# Patient Record
Sex: FEMALE | URBAN - METROPOLITAN AREA
[De-identification: names, ages, dates, MRNs, and addresses within clinical notes are randomized per-mention and may not be internally consistent; named-entity substitution may affect disease eponyms.]

---

## 2021-11-03 ENCOUNTER — OFFICE VISIT (OUTPATIENT)
Dept: URBAN - METROPOLITAN AREA CLINIC 48 | Facility: CLINIC | Age: 31
End: 2021-11-03
Payer: COMMERCIAL

## 2021-11-03 PROCEDURE — 92004 COMPRE OPH EXAM NEW PT 1/>: CPT | Performed by: OPHTHALMOLOGY

## 2021-11-03 ASSESSMENT — INTRAOCULAR PRESSURE
OS: 15
OD: 13

## 2021-11-03 NOTE — IMPRESSION/PLAN
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 Plan: 6th nerve palsy worse than last seen at [facility] 

bilateral 6th nerve palsy unclear if related to high ICP Had been off Diamox 24 hrs prior to LP opening pressure 22 cm H20 Patient to be referred to Dr. Rogerio Rain in [facility] at [facility] If Dr. Lawrence Stinson has any questions please call Dr. Viet Latif at [phone] RTC  Next week followup  with

## 2021-11-10 ENCOUNTER — OFFICE VISIT (OUTPATIENT)
Dept: URBAN - METROPOLITAN AREA CLINIC 48 | Facility: CLINIC | Age: 31
End: 2021-11-10
Payer: COMMERCIAL

## 2021-11-10 PROCEDURE — 92012 INTRM OPH EXAM EST PATIENT: CPT | Performed by: OPHTHALMOLOGY

## 2021-11-10 PROCEDURE — 92083 EXTENDED VISUAL FIELD XM: CPT | Performed by: OPHTHALMOLOGY

## 2021-11-10 ASSESSMENT — INTRAOCULAR PRESSURE
OD: 17
OS: 14

## 2021-11-10 NOTE — IMPRESSION/PLAN
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 Referred to Neuro Ophthalmologist Dr. Alexandra Rojas Friday November 12 4 pm

Patient needs VA check, OCT nerve and VF 24-2 prior to consult which has been done in this office Plan: 90% palsy and worsening. Discussed with patient will refer to Neuroophthalmologist.  Will send patient with notes by hand.   



RTC 2 week follow up with OCT nerve

## 2021-11-24 ENCOUNTER — OFFICE VISIT (OUTPATIENT)
Dept: URBAN - METROPOLITAN AREA CLINIC 48 | Facility: CLINIC | Age: 31
End: 2021-11-24
Payer: COMMERCIAL

## 2021-11-24 DIAGNOSIS — H49.23 BILATERAL 6TH NERVE PALSY: Primary | ICD-10-CM

## 2021-11-24 PROCEDURE — 92014 COMPRE OPH EXAM EST PT 1/>: CPT | Performed by: OPHTHALMOLOGY

## 2021-11-24 ASSESSMENT — INTRAOCULAR PRESSURE
OS: 17
OD: 14

## 2021-11-24 NOTE — IMPRESSION/PLAN
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 Saw Neuro Ophthalmologist Dr. Landon Newberry who told patient he feels this will get better with time. Today's exam coincides with this. She has a 60% ABduction deficit on the right and 50% on the left. Although this is significant is improved from my last 2 exams. Plan: Diamox 500 twice a day to be continued I will check to confirm improvement in 2-3 weeks OCT Nerve at that time She is to see Dr. Landon Newberry again in January - today's note faxed to Dr. Landon Newberry RTC 2 wks.  with RNFL

## 2021-12-10 ENCOUNTER — OFFICE VISIT (OUTPATIENT)
Dept: URBAN - METROPOLITAN AREA CLINIC 48 | Facility: CLINIC | Age: 31
End: 2021-12-10
Payer: COMMERCIAL

## 2021-12-10 PROCEDURE — 92012 INTRM OPH EXAM EST PATIENT: CPT | Performed by: OPHTHALMOLOGY

## 2021-12-10 ASSESSMENT — INTRAOCULAR PRESSURE
OD: 14
OS: 16

## 2021-12-10 NOTE — IMPRESSION/PLAN
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 Saw Neuro Ophthalmologist Dr. Damien Briseno who told patient he feels this will get better with time. Today's exam coincides with this. She has a 10-15 % ABduction deficit on the right and 10-15 % on the left. Although this is significant is improved from my last 2 exams. Plan: PO 
Continue Diamox 500 mg. 1 tab. BID 
(consider tapering) keep appt. as scheduled w/Dr. Damien Briseno again in January RTC 2 months with VF 24-2,  RNFL, Ishahara test,  tech to do pupil check then dilate.

## 2022-02-15 ENCOUNTER — OFFICE VISIT (OUTPATIENT)
Dept: URBAN - METROPOLITAN AREA CLINIC 48 | Facility: CLINIC | Age: 32
End: 2022-02-15
Payer: COMMERCIAL

## 2022-02-15 PROCEDURE — 92014 COMPRE OPH EXAM EST PT 1/>: CPT | Performed by: OPHTHALMOLOGY

## 2022-02-15 PROCEDURE — 92083 EXTENDED VISUAL FIELD XM: CPT | Performed by: OPHTHALMOLOGY

## 2022-02-15 PROCEDURE — 92133 CPTRZD OPH DX IMG PST SGM ON: CPT | Performed by: OPHTHALMOLOGY

## 2022-02-15 ASSESSMENT — INTRAOCULAR PRESSURE
OD: 13
OS: 15

## 2022-02-15 NOTE — IMPRESSION/PLAN
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 Plan: suspect 6th nerve from increased ICP from IUD. Full EOM today. If patient has double vision, or headaches, patient to call office to be seen sooner. Keep seeing Neurologist in [facility] 6 months follow up with VF 24-2, OCT nerve